# Patient Record
Sex: MALE | Race: WHITE | Employment: FULL TIME | URBAN - METROPOLITAN AREA
[De-identification: names, ages, dates, MRNs, and addresses within clinical notes are randomized per-mention and may not be internally consistent; named-entity substitution may affect disease eponyms.]

---

## 2017-07-19 ENCOUNTER — OFFICE VISIT (OUTPATIENT)
Dept: URGENT CARE | Facility: URGENT CARE | Age: 32
End: 2017-07-19
Payer: COMMERCIAL

## 2017-07-19 VITALS
OXYGEN SATURATION: 97 % | TEMPERATURE: 98.1 F | SYSTOLIC BLOOD PRESSURE: 140 MMHG | WEIGHT: 267 LBS | DIASTOLIC BLOOD PRESSURE: 86 MMHG | HEART RATE: 78 BPM

## 2017-07-19 DIAGNOSIS — R07.0 THROAT PAIN: Primary | ICD-10-CM

## 2017-07-19 LAB
DEPRECATED S PYO AG THROAT QL EIA: NORMAL
MICRO REPORT STATUS: NORMAL
SPECIMEN SOURCE: NORMAL

## 2017-07-19 PROCEDURE — 99213 OFFICE O/P EST LOW 20 MIN: CPT | Performed by: FAMILY MEDICINE

## 2017-07-19 PROCEDURE — 87081 CULTURE SCREEN ONLY: CPT | Performed by: FAMILY MEDICINE

## 2017-07-19 PROCEDURE — 87880 STREP A ASSAY W/OPTIC: CPT | Performed by: FAMILY MEDICINE

## 2017-07-19 RX ORDER — AZITHROMYCIN 250 MG/1
TABLET, FILM COATED ORAL
Qty: 6 TABLET | Refills: 0 | Status: SHIPPED | OUTPATIENT
Start: 2017-07-19

## 2017-07-19 NOTE — NURSING NOTE
"Chief Complaint   Patient presents with     Pharyngitis     st,cough for past week,states breathing \"feels heavy\"     Cough       Initial /86 (BP Location: Right arm, Patient Position: Chair, Cuff Size: Adult Regular)  Pulse 78  Temp 98.1  F (36.7  C) (Oral)  Wt 267 lb (121.1 kg)  SpO2 97% There is no height or weight on file to calculate BMI.  Medication Reconciliation: complete   Camilo GREENFIELD    "

## 2017-07-19 NOTE — MR AVS SNAPSHOT
"              After Visit Summary   2017    Reinier Forte    MRN: [id number]           Patient Information     Date Of Birth          1985        Visit Information        Provider Department      2017 4:30 PM Darryl Ram MD [facility]        Today's Diagnoses     Throat pain    -  1       Follow-ups after your visit        Who to contact     If you have questions or need follow up information about today's clinic visit or your schedule please contact [facility] directly at [phone].  Normal or non-critical lab and imaging results will be communicated to you by [employer]hart, letter or phone within 4 business days after the clinic has received the results. If you do not hear from us within 7 days, please contact the clinic through [employer]hart or phone. If you have a critical or abnormal lab result, we will notify you by phone as soon as possible.  Submit refill requests through [employer] or call your pharmacy and they will forward the refill request to us. Please allow 3 business days for your refill to be completed.          Additional Information About Your Visit        MyChart Information     [employer] lets you send messages to your doctor, view your test results, renew your prescriptions, schedule appointments and more. To sign up, go to [email or web address] . Click on \"Log in\" on the left side of the screen, which will take you to the Welcome page. Then click on \"Sign up Now\" on the right side of the page.     You will be asked to enter the access code listed below, as well as some personal information. Please follow the directions to create your username and password.     Your access code is: [email or web address]  Expires: 10/18/2017 10:25 AM     Your access code will  in 90 days. If you need help or a new code, please call your [place] clinic or [phone].        Care EveryWhere ID     This is your Care EveryWhere ID. This could be used " by other organizations to access your [facility] medical records  [id number]        Your Vitals Were     Pulse Temperature Pulse Oximetry             78 98.1  F (36.7  C) (Oral) 97%          Blood Pressure from Last 3 Encounters:   07/19/17 140/86   11/19/13 128/80    Weight from Last 3 Encounters:   07/19/17 267 lb (121.1 kg)   11/19/13 239 lb 14.4 oz (108.8 kg)              We Performed the Following     Beta strep group A culture     Strep, Rapid Screen          Today's Medication Changes          These changes are accurate as of: 7/19/17 11:59 PM.  If you have any questions, ask your nurse or doctor.               Start taking these medicines.        Dose/Directions    azithromycin 250 MG tablet   Commonly known as:  ZITHROMAX   Used for:  Throat pain   Started by:  Darryl Ram MD        Two tablets first day, then one tablet daily for four days.   Quantity:  6 tablet   Refills:  0            Where to get your medicines      These medications were sent to [facility] [address] [place] [address] [address] AT [facility]  [address], [facility] [address]    Hours:  24-hours Phone:  [phone]     azithromycin 250 MG tablet                Primary Care Provider    None Specified       No primary provider on file.        Equal Access to Services     LIBERTAD JUAREZ AH: Carlos douglaso Sosandiali, waaxda luqadaha, qaybta kaalmada adeegyada, lui wagner. So [facility] [phone].    ATENCIÓN: Si habla español, tiene a rdz disposición servicios gratuitos de asistencia lingüística. Llame al [phone].    We comply with applicable federal civil rights laws and [place] laws. We do not discriminate on the basis of race, color, national origin, age, disability sex, sexual orientation or gender identity.            Thank you!     Thank you for choosing [facility]  for your care. Our goal is always to provide you with excellent  care. Hearing back from our patients is one way we can continue to improve our services. Please take a few minutes to complete the written survey that you may receive in the mail after your visit with us. Thank you!             Your Updated Medication List - Protect others around you: Learn how to safely use, store and throw away your medicines at www.disposemymeds.org.          This list is accurate as of: 7/19/17 11:59 PM.  Always use your most recent med list.                   Brand Name Dispense Instructions for use Diagnosis    azithromycin 250 MG tablet    ZITHROMAX    6 tablet    Two tablets first day, then one tablet daily for four days.    Throat pain       cetirizine 10 MG tablet    zyrTEC    30 tablet    Take 1 tablet (10 mg) by mouth every evening    Itching, Rash       PROPRANOLOL HCL PO           TAPAZOLE PO

## 2017-07-20 LAB
BACTERIA SPEC CULT: NORMAL
MICRO REPORT STATUS: NORMAL
SPECIMEN SOURCE: NORMAL

## 2017-07-20 NOTE — PROGRESS NOTES
SUBJECTIVE:   Reinier Forte is a 32 year old male who complains of cough for several days. He denies a history of fatigue. He denies a history of asthma. Patient does not smoke cigarettes.     OBJECTIVE:  Vitals as noted by Nurse/MA above.  Appearance: in no apparent distress.   ENT- neck has bilateral anterior cervical nodes enlarged and nasal mucosa pale and congested.   Chest - chest clear to IPPA, no tachypnea, retractions or cyanosis, S1, S2 normal, no murmur, no gallop, rate regular and harsh breath sounds noted diffusely throughout both lungs.    ASSESSMENT:   Bronchitis    PLAN:  Symptomatic therapy suggested: push fluids and rest. Call or return to clinic prn if these symptoms worsen or fail to improve as anticipated.

## 2019-11-05 ENCOUNTER — HOSPITAL ENCOUNTER (EMERGENCY)
Facility: CLINIC | Age: 34
Discharge: HOME | End: 2019-11-05
Attending: EMERGENCY MEDICINE | Admitting: EMERGENCY MEDICINE
Payer: COMMERCIAL

## 2019-11-05 ENCOUNTER — APPOINTMENT (OUTPATIENT)
Dept: GENERAL RADIOLOGY | Facility: CLINIC | Age: 34
End: 2019-11-05
Attending: EMERGENCY MEDICINE
Payer: COMMERCIAL

## 2019-11-05 ENCOUNTER — APPOINTMENT (OUTPATIENT)
Dept: CT IMAGING | Facility: CLINIC | Age: 34
End: 2019-11-05
Attending: EMERGENCY MEDICINE
Payer: COMMERCIAL

## 2019-11-05 VITALS
HEART RATE: 63 BPM | OXYGEN SATURATION: 98 % | TEMPERATURE: 98.5 F | DIASTOLIC BLOOD PRESSURE: 105 MMHG | SYSTOLIC BLOOD PRESSURE: 162 MMHG | RESPIRATION RATE: 18 BRPM

## 2019-11-05 DIAGNOSIS — S46.912A SHOULDER STRAIN, LEFT, INITIAL ENCOUNTER: ICD-10-CM
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
## 2019-11-05 DIAGNOSIS — E04.9 ENLARGED THYROID GLAND: ICD-10-CM

## 2019-11-05 DIAGNOSIS — S16.1XXA CERVICAL STRAIN, INITIAL ENCOUNTER: ICD-10-CM

## 2019-11-05 PROCEDURE — 72125 CT NECK SPINE W/O DYE: CPT

## 2019-11-05 PROCEDURE — 72040 X-RAY EXAM NECK SPINE 2-3 VW: CPT

## 2019-11-05 PROCEDURE — 99285 EMERGENCY DEPT VISIT HI MDM: CPT | Mod: 25

## 2019-11-05 PROCEDURE — 73030 X-RAY EXAM OF SHOULDER: CPT | Mod: LT

## 2019-11-05 ASSESSMENT — ENCOUNTER SYMPTOMS
NECK PAIN: 1
FLANK PAIN: 1
ARTHRALGIAS: 1

## 2019-11-05 NOTE — LETTER
November 5, 2019      To Whom It May Concern:      Reinier Forte was seen in our Emergency Department today, 11/05/19.  I expect his condition to improve over the next 1-2 days.  He may return to work when improved.    Sincerely,        SHAWANDA Camejo

## 2019-11-05 NOTE — ED AVS SNAPSHOT
Emergency Department  [address] [place] [address]  Phone:  [phone]  Fax:  [phone]                                    Reinier Forte   MRN: [id number]    Department:   Emergency Department   Date of Visit:  11/5/2019           After Visit Summary Signature Page    I have received my discharge instructions, and my questions have been answered. I have discussed any challenges I see with this plan with the nurse or doctor.    ..........................................................................................................................................  Patient/Patient Representative Signature      ..........................................................................................................................................  Patient Representative Print Name and Relationship to Patient    ..................................................               ................................................  Date                                   Time    ..........................................................................................................................................  Reviewed by Signature/Title    ...................................................              ..............................................  Date                                               Time          22EPIC Rev 08/18
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
